# Patient Record
Sex: FEMALE | Race: WHITE | NOT HISPANIC OR LATINO
[De-identification: names, ages, dates, MRNs, and addresses within clinical notes are randomized per-mention and may not be internally consistent; named-entity substitution may affect disease eponyms.]

---

## 2017-02-16 ENCOUNTER — APPOINTMENT (OUTPATIENT)
Dept: CARDIOLOGY | Facility: CLINIC | Age: 52
End: 2017-02-16

## 2017-03-15 ENCOUNTER — APPOINTMENT (OUTPATIENT)
Dept: CARDIOLOGY | Facility: CLINIC | Age: 52
End: 2017-03-15

## 2017-05-24 ENCOUNTER — APPOINTMENT (OUTPATIENT)
Dept: CARDIOLOGY | Facility: CLINIC | Age: 52
End: 2017-05-24

## 2017-05-30 ENCOUNTER — APPOINTMENT (OUTPATIENT)
Dept: CARDIOLOGY | Facility: CLINIC | Age: 52
End: 2017-05-30

## 2017-12-13 ENCOUNTER — APPOINTMENT (OUTPATIENT)
Dept: CARDIOLOGY | Facility: CLINIC | Age: 52
End: 2017-12-13

## 2019-05-31 ENCOUNTER — RECORD ABSTRACTING (OUTPATIENT)
Age: 54
End: 2019-05-31

## 2019-06-03 ENCOUNTER — NON-APPOINTMENT (OUTPATIENT)
Age: 54
End: 2019-06-03

## 2019-06-03 ENCOUNTER — APPOINTMENT (OUTPATIENT)
Dept: CARDIOLOGY | Facility: CLINIC | Age: 54
End: 2019-06-03
Payer: COMMERCIAL

## 2019-06-03 VITALS
OXYGEN SATURATION: 97 % | DIASTOLIC BLOOD PRESSURE: 80 MMHG | SYSTOLIC BLOOD PRESSURE: 140 MMHG | HEART RATE: 96 BPM | WEIGHT: 258 LBS | HEIGHT: 65 IN | BODY MASS INDEX: 42.99 KG/M2

## 2019-06-03 DIAGNOSIS — Z86.2 PERSONAL HISTORY OF DISEASES OF THE BLOOD AND BLOOD-FORMING ORGANS AND CERTAIN DISORDERS INVOLVING THE IMMUNE MECHANISM: ICD-10-CM

## 2019-06-03 DIAGNOSIS — Z87.898 PERSONAL HISTORY OF OTHER SPECIFIED CONDITIONS: ICD-10-CM

## 2019-06-03 DIAGNOSIS — Z86.79 PERSONAL HISTORY OF OTHER DISEASES OF THE CIRCULATORY SYSTEM: ICD-10-CM

## 2019-06-03 DIAGNOSIS — E83.42 HYPOMAGNESEMIA: ICD-10-CM

## 2019-06-03 DIAGNOSIS — Z86.39 PERSONAL HISTORY OF OTHER ENDOCRINE, NUTRITIONAL AND METABOLIC DISEASE: ICD-10-CM

## 2019-06-03 DIAGNOSIS — Z87.19 PERSONAL HISTORY OF OTHER DISEASES OF THE DIGESTIVE SYSTEM: ICD-10-CM

## 2019-06-03 DIAGNOSIS — E78.5 HYPERLIPIDEMIA, UNSPECIFIED: ICD-10-CM

## 2019-06-03 DIAGNOSIS — Z78.9 OTHER SPECIFIED HEALTH STATUS: ICD-10-CM

## 2019-06-03 PROCEDURE — 0296T: CPT | Mod: 59

## 2019-06-03 PROCEDURE — 99214 OFFICE O/P EST MOD 30 MIN: CPT

## 2019-06-03 PROCEDURE — 93000 ELECTROCARDIOGRAM COMPLETE: CPT

## 2019-06-03 RX ORDER — MESALAMINE 1.2 G/1
1.2 TABLET, DELAYED RELEASE ORAL TWICE DAILY
Refills: 0 | Status: ACTIVE | COMMUNITY

## 2019-06-03 RX ORDER — POTASSIUM CHLORIDE 1500 MG/1
20 TABLET, EXTENDED RELEASE ORAL DAILY
Refills: 0 | Status: ACTIVE | COMMUNITY

## 2019-06-03 RX ORDER — HYDROCHLOROTHIAZIDE 25 MG/1
25 TABLET ORAL DAILY
Refills: 0 | Status: ACTIVE | COMMUNITY

## 2019-06-03 RX ORDER — FOLIC ACID 1 MG/1
1 TABLET ORAL DAILY
Refills: 0 | Status: ACTIVE | COMMUNITY

## 2019-06-03 NOTE — HISTORY OF PRESENT ILLNESS
[FreeTextEntry1] : MIGUEL ANGEL CLEVELAND  is a 53 year old  F\par \par Follow up of PAF s/p RFA, HTN, obesity, crohn's, low Mg, edema\par \par There is no prior history of a clinical myocardial infarction, coronary revascularization. \par There is no history of symptomatic congestive heart failure rheumatic heart disease or valvular disease.\par There is no history of symptomatic arrhythmias including atrial fibrillation.\par \par Intermittent palpitations for months. \par Put herself on the monitor at the hospital, which demonstrated premature beats. \par There are recurrent episodes, several times a month, and the symptoms can last for hours at a time. \par There is no exertional chest pain, pressure or discomfort. \par There is no significant dyspnea on exertion or orthopnea. \par There is nolightheadedness, dizziness or syncope.\par No bleeding issues. No bright red blood per rectum or peptic ulcer disease. \par She reports minor weight gain\par Overall less active due to foot pain. \par \par EKG demonstrates normal sinus rhythm \par \par Interim, lived in [place] due to the illness and subsequent death of her mother.

## 2019-06-03 NOTE — PHYSICAL EXAM
[General Appearance - Well Developed] : well developed [Well Groomed] : well groomed [Normal Appearance] : normal appearance [General Appearance - Well Nourished] : well nourished [No Deformities] : no deformities [General Appearance - In No Acute Distress] : no acute distress [Normal Conjunctiva] : the conjunctiva exhibited no abnormalities [Eyelids - No Xanthelasma] : the eyelids demonstrated no xanthelasmas [Normal Oral Mucosa] : normal oral mucosa [No Oral Pallor] : no oral pallor [No Oral Cyanosis] : no oral cyanosis [Normal Jugular Venous A Waves Present] : normal jugular venous A waves present [Normal Jugular Venous V Waves Present] : normal jugular venous V waves present [No Jugular Venous Erickson A Waves] : no jugular venous erickson A waves [Respiration, Rhythm And Depth] : normal respiratory rhythm and effort [Exaggerated Use Of Accessory Muscles For Inspiration] : no accessory muscle use [Auscultation Breath Sounds / Voice Sounds] : lungs were clear to auscultation bilaterally [Heart Rate And Rhythm] : heart rate and rhythm were normal [Murmurs] : no murmurs present [Abdomen Soft] : soft [Abdomen Tenderness] : non-tender [Abdomen Mass (___ Cm)] : no abdominal mass palpated [Abnormal Walk] : normal gait [Nail Clubbing] : no clubbing of the fingernails [Cyanosis, Localized] : no localized cyanosis [Petechial Hemorrhages (___cm)] : no petechial hemorrhages [Skin Color & Pigmentation] : normal skin color and pigmentation [] : no rash [No Venous Stasis] : no venous stasis [Skin Lesions] : no skin lesions [No Skin Ulcers] : no skin ulcer [No Xanthoma] : no  xanthoma was observed [Oriented To Time, Place, And Person] : oriented to person, place, and time [Affect] : the affect was normal [Mood] : the mood was normal [No Anxiety] : not feeling anxious [FreeTextEntry1] : distant hs

## 2019-06-03 NOTE — ASSESSMENT
[FreeTextEntry1] : h/o PAF (prior sx, RVR), s/p RFA, off flecainide\par HTN\par Palpitations\par \par Start beta blocker due to hypertension and symptomatic palpitations.\par Zio patch will be applied to rule out recurrent atrial fibrillation. \par Followup echocardiogram. \par Blood work has been requested\par Continue anticoagulation.  \par Monitor hemoglobin and renal function. \par Weight loss for long-term cardiovascular health\par Supplement lytes\par Low-sodium diet. \par \par

## 2019-06-21 PROCEDURE — 0298T: CPT

## 2019-06-24 ENCOUNTER — APPOINTMENT (OUTPATIENT)
Dept: CARDIOLOGY | Facility: CLINIC | Age: 54
End: 2019-06-24
Payer: COMMERCIAL

## 2019-06-24 PROCEDURE — 93306 TTE W/DOPPLER COMPLETE: CPT

## 2019-07-15 ENCOUNTER — APPOINTMENT (OUTPATIENT)
Dept: CARDIOLOGY | Facility: CLINIC | Age: 54
End: 2019-07-15
Payer: COMMERCIAL

## 2019-07-15 VITALS
WEIGHT: 252 LBS | DIASTOLIC BLOOD PRESSURE: 70 MMHG | BODY MASS INDEX: 41.99 KG/M2 | SYSTOLIC BLOOD PRESSURE: 126 MMHG | OXYGEN SATURATION: 98 % | HEART RATE: 84 BPM | HEIGHT: 65 IN

## 2019-07-15 PROCEDURE — 99214 OFFICE O/P EST MOD 30 MIN: CPT

## 2019-07-15 NOTE — REASON FOR VISIT
[Consultation] : a consultation regarding [Atrial Fibrillation] : atrial fibrillation [Palpitations] : palpitations

## 2019-07-15 NOTE — REVIEW OF SYSTEMS
[Recent Weight Gain (___ Lbs)] : recent [unfilled] ~Ulb weight gain [Palpitations] : palpitations [see HPI] : see HPI [Joint Pain] : joint pain [Under Stress] : under stress [Negative] : Heme/Lymph

## 2019-07-17 NOTE — HISTORY OF PRESENT ILLNESS
[FreeTextEntry1] : MIGUEL ANGEL CLEVELAND  is a 53 year old  F\par Follow up of PAF s/p RFA, HTN, obesity, crohn's, low Mg, edema\par \par There is no prior history of a clinical myocardial infarction, coronary revascularization. \par There is no history of symptomatic congestive heart failure rheumatic heart disease or valvular disease.\par There is no history of symptomatic arrhythmias including atrial fibrillation.\par \par Intermittent palpitations for months. \par Put herself on the monitor at the hospital, which demonstrated premature beats. \par There are recurrent episodes, several times a month, and the symptoms can last for hours at a time. \par There is no exertional chest pain, pressure or discomfort. \par There is no significant dyspnea on exertion or orthopnea. \par There is no lightheadedness, dizziness or syncope.\par No bleeding issues. No bright red blood per rectum or peptic ulcer disease. \par She reports minor weight gain\par Overall less active due to foot pain. \par \par Overall she reports feeling improved on metoprolol. \par There has been improvement in her blood pressure. \par June 2019, hemoglobin 11.2, potassium 3.9, creatinine 0.6, total chol 161, LDL 61 A1c 5.6. TSH 2.4. \par Echocardiogram demonstrates normal left ventricular function, minimal valvular heart disease, mild left atrial enlargement. \par Zio patch with rare ectopy sequential APCs\par EKG demonstrates normal sinus rhythm \par

## 2019-07-17 NOTE — PHYSICAL EXAM
[General Appearance - Well Developed] : well developed [Normal Appearance] : normal appearance [Well Groomed] : well groomed [General Appearance - Well Nourished] : well nourished [No Deformities] : no deformities [General Appearance - In No Acute Distress] : no acute distress [Normal Conjunctiva] : the conjunctiva exhibited no abnormalities [Eyelids - No Xanthelasma] : the eyelids demonstrated no xanthelasmas [Normal Oral Mucosa] : normal oral mucosa [No Oral Pallor] : no oral pallor [No Oral Cyanosis] : no oral cyanosis [Normal Jugular Venous A Waves Present] : normal jugular venous A waves present [Normal Jugular Venous V Waves Present] : normal jugular venous V waves present [No Jugular Venous Erickson A Waves] : no jugular venous erickson A waves [Respiration, Rhythm And Depth] : normal respiratory rhythm and effort [Exaggerated Use Of Accessory Muscles For Inspiration] : no accessory muscle use [Auscultation Breath Sounds / Voice Sounds] : lungs were clear to auscultation bilaterally [Heart Rate And Rhythm] : heart rate and rhythm were normal [Murmurs] : no murmurs present [Abdomen Soft] : soft [Abdomen Tenderness] : non-tender [Abdomen Mass (___ Cm)] : no abdominal mass palpated [Abnormal Walk] : normal gait [Nail Clubbing] : no clubbing of the fingernails [Cyanosis, Localized] : no localized cyanosis [Petechial Hemorrhages (___cm)] : no petechial hemorrhages [Skin Color & Pigmentation] : normal skin color and pigmentation [] : no rash [No Venous Stasis] : no venous stasis [Skin Lesions] : no skin lesions [No Skin Ulcers] : no skin ulcer [No Xanthoma] : no  xanthoma was observed [Oriented To Time, Place, And Person] : oriented to person, place, and time [Affect] : the affect was normal [Mood] : the mood was normal [No Anxiety] : not feeling anxious [FreeTextEntry1] : distant hs

## 2019-07-17 NOTE — ASSESSMENT
[FreeTextEntry1] : h/o PAF (prior sx, RVR), s/p RFA, off flecainide\par HTN\par Palpitations\par \par There is normal left ventricular function. There is minimal valvular heart disease. \par Benign premature beats. \par No recurrent atrial fibrillation. \par Continue metoprolol.\par Continue anticoagulation. \par Monitor hemoglobin and renal function. \par Weight loss for long-term cardiovascular health\par Supplement lytes\par Low-sodium diet. \par

## 2019-07-18 ENCOUNTER — MEDICATION RENEWAL (OUTPATIENT)
Age: 54
End: 2019-07-18

## 2019-10-08 ENCOUNTER — MEDICATION RENEWAL (OUTPATIENT)
Age: 54
End: 2019-10-08

## 2019-12-18 ENCOUNTER — RECORD ABSTRACTING (OUTPATIENT)
Age: 54
End: 2019-12-18

## 2019-12-30 ENCOUNTER — APPOINTMENT (OUTPATIENT)
Dept: CARDIOLOGY | Facility: CLINIC | Age: 54
End: 2019-12-30
Payer: COMMERCIAL

## 2019-12-30 ENCOUNTER — NON-APPOINTMENT (OUTPATIENT)
Age: 54
End: 2019-12-30

## 2019-12-30 VITALS
HEIGHT: 65 IN | WEIGHT: 265 LBS | OXYGEN SATURATION: 98 % | BODY MASS INDEX: 44.15 KG/M2 | DIASTOLIC BLOOD PRESSURE: 68 MMHG | SYSTOLIC BLOOD PRESSURE: 124 MMHG | HEART RATE: 90 BPM

## 2019-12-30 PROCEDURE — 99214 OFFICE O/P EST MOD 30 MIN: CPT

## 2019-12-30 PROCEDURE — 93000 ELECTROCARDIOGRAM COMPLETE: CPT

## 2019-12-30 NOTE — REASON FOR VISIT
[Atrial Fibrillation] : atrial fibrillation [Palpitations] : palpitations [Consultation] : a consultation regarding [Hypertension] : hypertension

## 2019-12-31 NOTE — HISTORY OF PRESENT ILLNESS
[FreeTextEntry1] : MIGUEL ANGEL CLEVELAND  is a 54 year old  F\par Follow up of PAF s/p RFA, HTN, obesity, crohn's, low Mg, edema\par \par There is no prior history of a clinical myocardial infarction, coronary revascularization. \par There is no history of symptomatic congestive heart failure rheumatic heart disease or valvular disease.\par \par There is no exertional chest pain, pressure or discomfort. \par There is no significant dyspnea on exertion or orthopnea. \par There is no lightheadedness, dizziness or syncope.\par No bleeding issues. No bright red blood per rectum or peptic ulcer disease. \par \par She notes more palpitations over the past months. Described as hard beats, which last for seconds. \par \par EKG demonstrates normal sinus rhythm.  \par June 2019, hemoglobin 11.2, potassium 3.9, creatinine 0.6, total chol 161, LDL 61 A1c 5.6. TSH 2.4. \par Echocardiogram 6/19 demonstrates normal left ventricular function, minimal valvular heart disease, mild left atrial enlargement. \par Zio patch 6/19 with rare ectopy sequential APCs\par

## 2019-12-31 NOTE — PHYSICAL EXAM
[General Appearance - Well Developed] : well developed [Well Groomed] : well groomed [Normal Appearance] : normal appearance [General Appearance - Well Nourished] : well nourished [No Deformities] : no deformities [General Appearance - In No Acute Distress] : no acute distress [Normal Conjunctiva] : the conjunctiva exhibited no abnormalities [Eyelids - No Xanthelasma] : the eyelids demonstrated no xanthelasmas [No Oral Pallor] : no oral pallor [Normal Oral Mucosa] : normal oral mucosa [No Oral Cyanosis] : no oral cyanosis [Normal Jugular Venous A Waves Present] : normal jugular venous A waves present [Normal Jugular Venous V Waves Present] : normal jugular venous V waves present [No Jugular Venous Erickson A Waves] : no jugular venous erickson A waves [Respiration, Rhythm And Depth] : normal respiratory rhythm and effort [Exaggerated Use Of Accessory Muscles For Inspiration] : no accessory muscle use [Auscultation Breath Sounds / Voice Sounds] : lungs were clear to auscultation bilaterally [Heart Rate And Rhythm] : heart rate and rhythm were normal [Murmurs] : no murmurs present [Abdomen Soft] : soft [Abdomen Tenderness] : non-tender [Abnormal Walk] : normal gait [Abdomen Mass (___ Cm)] : no abdominal mass palpated [Nail Clubbing] : no clubbing of the fingernails [Cyanosis, Localized] : no localized cyanosis [Petechial Hemorrhages (___cm)] : no petechial hemorrhages [Skin Color & Pigmentation] : normal skin color and pigmentation [] : no rash [No Venous Stasis] : no venous stasis [Skin Lesions] : no skin lesions [No Skin Ulcers] : no skin ulcer [No Xanthoma] : no  xanthoma was observed [Affect] : the affect was normal [Oriented To Time, Place, And Person] : oriented to person, place, and time [Mood] : the mood was normal [No Anxiety] : not feeling anxious [FreeTextEntry1] : distant hs

## 2019-12-31 NOTE — ASSESSMENT
[FreeTextEntry1] : h/o PAF (prior sx, RVR), s/p RFA, off flecainide\par HTN\par Palpitations\par \par There is normal left ventricular function. There is minimal valvular heart disease. \par Benign premature beats. \par No recurrent atrial fibrillation.\par \par I have increased her dose of metoprolol. \par Followup sleep study and blood work have been requested \par Continue anticoagulation.  Monitor hemoglobin and renal function. \par Weight loss for long-term cardiovascular health\par Supplement lytes\par Low-sodium diet. \par

## 2019-12-31 NOTE — REVIEW OF SYSTEMS
[Palpitations] : palpitations [Joint Pain] : joint pain [Under Stress] : under stress [Negative] : Heme/Lymph [see HPI] : see HPI [Change In The Stool] : change in stool [Recent Weight Gain (___ Lbs)] : no recent weight gain

## 2020-06-15 ENCOUNTER — APPOINTMENT (OUTPATIENT)
Dept: CARDIOLOGY | Facility: CLINIC | Age: 55
End: 2020-06-15
Payer: COMMERCIAL

## 2020-06-15 VITALS
OXYGEN SATURATION: 93 % | SYSTOLIC BLOOD PRESSURE: 126 MMHG | BODY MASS INDEX: 42.32 KG/M2 | HEART RATE: 66 BPM | DIASTOLIC BLOOD PRESSURE: 72 MMHG | WEIGHT: 254 LBS | TEMPERATURE: 98.3 F | HEIGHT: 65 IN

## 2020-06-15 PROCEDURE — 99214 OFFICE O/P EST MOD 30 MIN: CPT

## 2020-06-15 NOTE — REASON FOR VISIT
[Atrial Fibrillation] : atrial fibrillation [Consultation] : a consultation regarding [Hypertension] : hypertension [Palpitations] : palpitations

## 2020-06-15 NOTE — ADDENDUM
[FreeTextEntry1] : Please note the patient was seen and examined with JOVON Peace.\par I was physically present during the service of the patient and personally examined the patient. \irena I was directly involved in the management plan and recommendations of the care provided to the patient. \par I personally reviewed the history and physical examination as documented by the PA above.\par 06/15/2020\par

## 2020-06-15 NOTE — REVIEW OF SYSTEMS
[Change In The Stool] : change in stool [see HPI] : see HPI [Joint Pain] : joint pain [Negative] : Heme/Lymph [Recent Weight Gain (___ Lbs)] : no recent weight gain [Palpitations] : no palpitations [Under Stress] : not under stress

## 2020-06-15 NOTE — ASSESSMENT
[FreeTextEntry1] : MIGUEL ANGEL CLEVELAND  is a 54 year F  who presents today Sammy 15, 2020 with the above history and the following active issues. \par \par PAF (prior sx, RVR), s/p RFA, off flecainide. Palpitations improved on Toprol BID. Tolerating Eliquis 5mg BID. Bleeding precautions reviewed. \par \par HTN - blood pressure controlled on my assessment. Low sodium diet. Increase cardiovascular exercise. \par \par There is normal left ventricular function. There is minimal valvular heart disease. \par Benign premature beats. \par No recurrent atrial fibrillation.\par \par Followup sleep study and blood work have been requested \par \par Weight loss for long-term cardiovascular health\par \par Red flag symptoms which would warrant sooner emergent evaluation reviewed with the patient. \par Questions and concerns were addressed and answered. \par \par Sincerely,\par \par Sarah Peace PA-C\par Patients history, testing and plan reviewed with supervising MD: Dr. Virgilio Vidales \par

## 2020-06-15 NOTE — HISTORY OF PRESENT ILLNESS
[FreeTextEntry1] : MIGUEL ANGEL CLEVELAND  is a 54 year F  who presents today Sammy 15, 2020 in clinical follow-up. Last seen in our office on December 30, 2019. There has been no recent illness or hospital stay. Active on a regular basis with no new exertional complaints. Continues to work at the hospital.\par History of PAF s/p RFA, HTN, obesity, crohn's, low Mg, edema\par Palpitations improved on increase dose of Metoprolol.\par Prescriptions for Eliquis and Metoprolol renewed today.\par \par There is no prior history of a clinical myocardial infarction, coronary revascularization. \par There is no history of symptomatic congestive heart failure rheumatic heart disease or valvular disease.\par \par There is no exertional chest pain, pressure or discomfort. \par There is no significant dyspnea on exertion or orthopnea. \par There is no lightheadedness, dizziness or syncope.\par No bleeding issues. No bright red blood per rectum or peptic ulcer disease. \par \par Lab results 6/10/2020  WBC 8.8, Hgb 10.2, Hct 32.2, plat 141, Sodium 141, Potassium 4.0, BUN 14, Creat 7.2, LDL 43, total choelsterol 156, HDL 57\par \par EKG12/30/19 demonstrates normal sinus rhythm.  \par \par June 2019, hemoglobin 11.2, potassium 3.9, creatinine 0.6, total chol 161, LDL 61 A1c 5.6. TSH 2.4. \par Echocardiogram 6/19 demonstrates normal left ventricular function, minimal valvular heart disease, mild left atrial enlargement. \par \par Zio patch 6/19 with rare ectopy sequential APCs\par

## 2020-06-15 NOTE — PHYSICAL EXAM
[Well Groomed] : well groomed [General Appearance - Well Nourished] : well nourished [General Appearance - Well Developed] : well developed [Normal Appearance] : normal appearance [General Appearance - In No Acute Distress] : no acute distress [No Deformities] : no deformities [Normal Conjunctiva] : the conjunctiva exhibited no abnormalities [Eyelids - No Xanthelasma] : the eyelids demonstrated no xanthelasmas [No Oral Cyanosis] : no oral cyanosis [No Oral Pallor] : no oral pallor [Normal Oral Mucosa] : normal oral mucosa [Normal Jugular Venous A Waves Present] : normal jugular venous A waves present [No Jugular Venous Erickson A Waves] : no jugular venous erickson A waves [Normal Jugular Venous V Waves Present] : normal jugular venous V waves present [Exaggerated Use Of Accessory Muscles For Inspiration] : no accessory muscle use [Auscultation Breath Sounds / Voice Sounds] : lungs were clear to auscultation bilaterally [Respiration, Rhythm And Depth] : normal respiratory rhythm and effort [Heart Rate And Rhythm] : heart rate and rhythm were normal [Murmurs] : no murmurs present [Abdomen Soft] : soft [Abdomen Tenderness] : non-tender [Abdomen Mass (___ Cm)] : no abdominal mass palpated [Abnormal Walk] : normal gait [Cyanosis, Localized] : no localized cyanosis [Nail Clubbing] : no clubbing of the fingernails [Petechial Hemorrhages (___cm)] : no petechial hemorrhages [Skin Color & Pigmentation] : normal skin color and pigmentation [] : no rash [No Venous Stasis] : no venous stasis [No Xanthoma] : no  xanthoma was observed [Skin Lesions] : no skin lesions [No Skin Ulcers] : no skin ulcer [Affect] : the affect was normal [Oriented To Time, Place, And Person] : oriented to person, place, and time [Mood] : the mood was normal [No Anxiety] : not feeling anxious [FreeTextEntry1] : distant hs

## 2021-01-18 ENCOUNTER — NON-APPOINTMENT (OUTPATIENT)
Age: 56
End: 2021-01-18

## 2021-01-18 ENCOUNTER — APPOINTMENT (OUTPATIENT)
Dept: CARDIOLOGY | Facility: CLINIC | Age: 56
End: 2021-01-18
Payer: COMMERCIAL

## 2021-01-18 VITALS
HEART RATE: 95 BPM | SYSTOLIC BLOOD PRESSURE: 136 MMHG | DIASTOLIC BLOOD PRESSURE: 80 MMHG | TEMPERATURE: 98 F | WEIGHT: 247 LBS | OXYGEN SATURATION: 100 % | BODY MASS INDEX: 41.15 KG/M2 | HEIGHT: 65 IN

## 2021-01-18 DIAGNOSIS — R79.89 OTHER SPECIFIED ABNORMAL FINDINGS OF BLOOD CHEMISTRY: ICD-10-CM

## 2021-01-18 PROCEDURE — 99214 OFFICE O/P EST MOD 30 MIN: CPT

## 2021-01-18 PROCEDURE — 93000 ELECTROCARDIOGRAM COMPLETE: CPT

## 2021-01-18 PROCEDURE — 99072 ADDL SUPL MATRL&STAF TM PHE: CPT

## 2021-01-18 NOTE — REASON FOR VISIT
[Consultation] : a consultation regarding [Atrial Fibrillation] : atrial fibrillation [Hypertension] : hypertension [Palpitations] : palpitations

## 2021-01-19 NOTE — PHYSICAL EXAM
[General Appearance - Well Developed] : well developed [Normal Appearance] : normal appearance [Well Groomed] : well groomed [General Appearance - Well Nourished] : well nourished [No Deformities] : no deformities [General Appearance - In No Acute Distress] : no acute distress [Normal Conjunctiva] : the conjunctiva exhibited no abnormalities [Eyelids - No Xanthelasma] : the eyelids demonstrated no xanthelasmas [Normal Oral Mucosa] : normal oral mucosa [No Oral Pallor] : no oral pallor [No Oral Cyanosis] : no oral cyanosis [Normal Jugular Venous A Waves Present] : normal jugular venous A waves present [Normal Jugular Venous V Waves Present] : normal jugular venous V waves present [No Jugular Venous Erickson A Waves] : no jugular venous erickson A waves [Respiration, Rhythm And Depth] : normal respiratory rhythm and effort [Exaggerated Use Of Accessory Muscles For Inspiration] : no accessory muscle use [Auscultation Breath Sounds / Voice Sounds] : lungs were clear to auscultation bilaterally [Heart Rate And Rhythm] : heart rate and rhythm were normal [Murmurs] : no murmurs present [Abdomen Tenderness] : non-tender [Abdomen Soft] : soft [Abdomen Mass (___ Cm)] : no abdominal mass palpated [Nail Clubbing] : no clubbing of the fingernails [Abnormal Walk] : normal gait [Cyanosis, Localized] : no localized cyanosis [Petechial Hemorrhages (___cm)] : no petechial hemorrhages [Skin Color & Pigmentation] : normal skin color and pigmentation [] : no rash [No Venous Stasis] : no venous stasis [Skin Lesions] : no skin lesions [No Skin Ulcers] : no skin ulcer [No Xanthoma] : no  xanthoma was observed [Affect] : the affect was normal [Oriented To Time, Place, And Person] : oriented to person, place, and time [Mood] : the mood was normal [No Anxiety] : not feeling anxious [FreeTextEntry1] : distant hs

## 2021-01-19 NOTE — HISTORY OF PRESENT ILLNESS
[FreeTextEntry1] : MIGUEL ANGEL CLEVELAND  is a 55 year old  F\par \par History of PAF s/p RFA, HTN, obesity, crohn's, low Mg, edema\par There is no prior history of a clinical myocardial infarction, coronary revascularization. \par There is no history of symptomatic congestive heart failure rheumatic heart disease or valvular disease.\par There has been no recent illness or hospital stay. \par \par Active on a regular basis with no new exertional complaints. \par Continues to work at the hospital.\par Palpitations improved on increase dose of Metoprolol.\par There is no exertional chest pain, pressure or discomfort. \par There is no significant dyspnea on exertion or orthopnea. \par There is no lightheadedness, dizziness or syncope.\par No bleeding issues. No bright red blood per rectum or peptic ulcer disease. \par \par EKG demonstrates normal sinus rhythm poor R wave progression and nonspecific ST changes.  \par Lab results 6/10/2020  WBC 8.8, Hgb 10.2, Hct 32.2, plat 141, Sodium 141, Potassium 4.0, BUN 14, Creat 7.2, LDL 43, total choelsterol 156, HDL 57\par \par Echocardiogram 6/19 demonstrates normal left ventricular function, minimal valvular heart disease, mild left atrial enlargement. \par \par Zio patch 6/19 with rare ectopy sequential APCs\par \par

## 2021-01-19 NOTE — ASSESSMENT
[FreeTextEntry1] : \par PAF (prior sx, RVR), s/p RFA\par Off flecainide. \par Benign premature beats. \par No recurrent atrial fibrillation.\par Palpitations improved on Toprol BID. \par Tolerating Eliquis 5mg BID. Bleeding precautions reviewed. \par \par HTN \par Blood pressure controlled on my assessment.\par Low sodium diet\par Increase cardiovascular exercise. \par \par Weight loss for long-term cardiovascular health \par \par Continue anticoagulation.  Introduced possible long-term monitoring if off anticoagulation.  \par Follow-up blood work has been requested.  \par Monitor LV function and valvular heart disease.  \par Heart rate and blood pressure improved on higher dose of metoprolol.\par \par Red flag symptoms which would warrant sooner emergent evaluation reviewed with the patient. \par Questions and concerns were addressed and answered. \par

## 2021-04-20 ENCOUNTER — NON-APPOINTMENT (OUTPATIENT)
Age: 56
End: 2021-04-20

## 2021-06-15 ENCOUNTER — APPOINTMENT (OUTPATIENT)
Dept: CARDIOLOGY | Facility: CLINIC | Age: 56
End: 2021-06-15

## 2021-06-25 ENCOUNTER — APPOINTMENT (OUTPATIENT)
Dept: CARDIOLOGY | Facility: CLINIC | Age: 56
End: 2021-06-25
Payer: COMMERCIAL

## 2021-06-25 VITALS
HEART RATE: 76 BPM | TEMPERATURE: 97.5 F | HEIGHT: 65 IN | OXYGEN SATURATION: 99 % | SYSTOLIC BLOOD PRESSURE: 124 MMHG | DIASTOLIC BLOOD PRESSURE: 80 MMHG | WEIGHT: 238 LBS | BODY MASS INDEX: 39.65 KG/M2

## 2021-06-25 PROCEDURE — 99072 ADDL SUPL MATRL&STAF TM PHE: CPT

## 2021-06-25 PROCEDURE — 99214 OFFICE O/P EST MOD 30 MIN: CPT

## 2021-06-25 RX ORDER — CHLORHEXIDINE GLUCONATE, 0.12% ORAL RINSE 1.2 MG/ML
0.12 SOLUTION DENTAL
Qty: 473 | Refills: 0 | Status: DISCONTINUED | COMMUNITY
Start: 2021-01-13 | End: 2021-06-25

## 2021-06-25 RX ORDER — CHROMIUM 200 MCG
TABLET ORAL
Refills: 0 | Status: ACTIVE | COMMUNITY

## 2021-06-25 RX ORDER — IRON/IRON ASP GLY/FA/MV-MIN 38 125-25-1MG
TABLET ORAL
Refills: 0 | Status: ACTIVE | COMMUNITY

## 2021-06-25 NOTE — ASSESSMENT
[FreeTextEntry1] : MIGUEL ANGEL CLEVELAND is a 55 year old F who presents today Jun 25, 2021 here for routine cardiovascular followup. \par \par PAF (prior sx, RVR), s/p RFA  2016\par Off flecainide. \par Benign premature beats. \par No recurrent atrial fibrillation.\par Palpitations improved on Toprol BID. \par Tolerating Eliquis 5mg BID. Bleeding precautions reviewed. \par If off AC consider long term monitoring. \par Note baseline anemia (hgb range 9-11) on iron supp. Past colo showed no bleeding. \par \par HTN \par Blood pressure controlled on my assessment.\par Low sodium diet\par Increase cardiovascular exercise. \par Weight loss for long-term cardiovascular health \par Congrats on recent dieting/weight loss\par \par Monitor LV function and valvular heart disease.  \par Repeat echo requested. Will call her with test results. \par \par No new symptoms from cardiovascular standpoint. Very well controlled lipid panel, A1c on labs. Continue risk factor modification. Stress testing deferred d/t lack of symptoms. If any new exertional symptoms occur she'll call us right away. Any questions and concerns were addressed and resolved. \par \par Sincerely,\par \par CUONG Mahoney\par Patients history, testing, and plan reviewed with supervising MD: Dr. Sanchez Hendrickson \par

## 2021-06-25 NOTE — HISTORY OF PRESENT ILLNESS
[FreeTextEntry1] : MIGUEL ANGEL CLEVELAND  is a 55 year old  F here for routine cardiovascular followup. \par \par History of PAF s/p RFA 2016, HTN, obesity, Crohn's, low Mg, edema, anemia (hgb range 9-11)\par There is no prior history of a clinical myocardial infarction, coronary revascularization. \par There is no history of symptomatic congestive heart failure rheumatic heart disease or valvular disease.\par There has been no recent illness or hospital stay. 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 Attends the gym on weekends. She has recently started dieting and lost 18 lbs. \par Palpitations improved on increase dose of Metoprolol. No sustained symptomatic recurrence. \par There is no exertional chest pain, pressure or discomfort. \par There is no significant dyspnea on exertion or orthopnea. \par There is no lightheadedness, dizziness or syncope.\par No bleeding issues. No bright red blood per rectum or peptic ulcer disease. \par \par Labs April 2021. hgb 9.6, k 4.2, creat 0.68, LDL 60, HDL 54, A1c 5.4, Mg 1.7, CRP/BNP/CK/TSH/Lp(a) wnl\par \par EKG Jan 2021 demonstrates normal sinus rhythm poor R wave progression and nonspecific ST changes.  \par \par Echocardiogram 6/19 demonstrates normal left ventricular function, minimal valvular heart disease, mild left atrial enlargement. \par \par Zio patch 6/19 with rare ectopy sequential APCs

## 2021-09-28 PROBLEM — R79.89 LOW SERUM LIPOPROTEIN(A): Status: RESOLVED | Noted: 2021-09-28 | Resolved: 2021-09-28

## 2022-01-04 ENCOUNTER — APPOINTMENT (OUTPATIENT)
Dept: CARDIOLOGY | Facility: CLINIC | Age: 57
End: 2022-01-04

## 2022-01-18 ENCOUNTER — APPOINTMENT (OUTPATIENT)
Dept: CARDIOLOGY | Facility: CLINIC | Age: 57
End: 2022-01-18
Payer: COMMERCIAL

## 2022-01-18 ENCOUNTER — NON-APPOINTMENT (OUTPATIENT)
Age: 57
End: 2022-01-18

## 2022-01-18 VITALS
OXYGEN SATURATION: 99 % | WEIGHT: 235 LBS | DIASTOLIC BLOOD PRESSURE: 74 MMHG | SYSTOLIC BLOOD PRESSURE: 130 MMHG | HEIGHT: 65 IN | BODY MASS INDEX: 39.15 KG/M2 | HEART RATE: 73 BPM | TEMPERATURE: 97.8 F

## 2022-01-18 PROCEDURE — 93000 ELECTROCARDIOGRAM COMPLETE: CPT

## 2022-01-18 PROCEDURE — 99214 OFFICE O/P EST MOD 30 MIN: CPT

## 2022-01-18 NOTE — HISTORY OF PRESENT ILLNESS
[FreeTextEntry1] : MIGUEL ANGEL CLEVELAND  is a 56 year old  F \par \par History of PAF s/p RFA 2016, HTN, obesity, Crohn's, low Mg, edema, anemia (hgb range 9-11)\par There is no prior history of a clinical myocardial infarction, coronary revascularization. \par There is no history of symptomatic congestive heart failure rheumatic heart disease or valvular disease.\par There has been no recent illness or hospital stay. 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 Using treadmill at home to stay active. Still losing weight, dieting. \par Palpitations improved on increase dose of Metoprolol. No sustained symptomatic recurrence. Checks on monitor at work sometimes and notes APCs. \par \par There is no exertional chest pain, pressure or discomfort. \par There is no significant dyspnea on exertion or orthopnea. \par There is no lightheadedness, dizziness or syncope.\par No bleeding issues. No bright red blood per rectum or peptic ulcer disease. \par \par EKG today 1/18/22 shows normal sinus rhythm with poor R wave progression unchanged. \par \par Labs April 2021. hgb 9.6, k 4.2, creat 0.68, LDL 60, HDL 54, A1c 5.4, Mg 1.7, CRP/BNP/CK/TSH/Lp(a) wnl\par \par Echocardiogram 6/2021 demonstrates normal left ventricular function, minimal valvular heart disease, mild left atrial enlargement. Unchanged from prior. \par \par Zio patch 6/19 with rare ectopy sequential APCs\par

## 2022-01-18 NOTE — ASSESSMENT
[FreeTextEntry1] : MIGUEL ANGEL CLEVELAND is a 56 year old F who presents today Jan 18, 2022 with the above history and the following active issues: \par \par PAF (prior sx, RVR), s/p RFA  2016\par Off flecainide. \par Benign premature beats. \par No recurrent atrial fibrillation.\par Palpitations improved on Toprol BID. \par Tolerating Eliquis 5mg BID. Bleeding precautions reviewed. Medications renewed. \par If off AC consider long term monitoring. \par Note baseline anemia (hgb range 9-11) on iron supp. Past colo showed no bleeding. \par Recommend routine CBC monitoring, upcoming labs w/ PCP requested. \par \par HTN \par Blood pressure controlled on my assessment.\par Low sodium diet\par Increase cardiovascular exercise. \par Weight loss for long-term cardiovascular health \par Congrats on recent dieting/weight loss\par \par Reviewed echo completed at [facility] in Jun '21 no change from prior. Normal EF, mild LAE, mild MR. \par Surveillance monitoring advised. \par \par No new symptoms from cardiovascular standpoint. Very well controlled lipid panel, A1c on labs. Continue risk factor modification. Stress testing deferred d/t lack of symptoms. If any new exertional symptoms occur she'll call us right away. Any questions and concerns were addressed and resolved. \par \par Sincerely,\par \par CUONG Mahoney\par Patients history, testing, and plan reviewed with supervising MD: Dr. Jenniffer Vargas

## 2022-07-18 ENCOUNTER — APPOINTMENT (OUTPATIENT)
Dept: CARDIOLOGY | Facility: CLINIC | Age: 57
End: 2022-07-18

## 2022-07-18 VITALS
BODY MASS INDEX: 38.61 KG/M2 | OXYGEN SATURATION: 97 % | DIASTOLIC BLOOD PRESSURE: 66 MMHG | SYSTOLIC BLOOD PRESSURE: 122 MMHG | WEIGHT: 232 LBS | HEART RATE: 72 BPM | TEMPERATURE: 97.5 F

## 2022-07-18 PROCEDURE — 99214 OFFICE O/P EST MOD 30 MIN: CPT

## 2022-08-02 NOTE — ASSESSMENT
[FreeTextEntry1] : \par Continue beta-blocker and anticoagulation. \par If recurrent GI bleeding and anemia discussed potential for left atrial appendage occlusion. \par \par PAF (prior sx, RVR), s/p RFA 2016\par Off flecainide. \par Benign premature beats. \par No recurrent atrial fibrillation.\par Palpitations improved on Toprol BID. \par Tolerating Eliquis 5mg BID. Bleeding precautions reviewed. Medications renewed. \par If off AC consider long term monitoring. \par Note baseline anemia (hgb range 9-11) on iron supp. \par routine CBC monitoring\par \par HTN \par Blood pressure controlled on my assessment.\par Low sodium diet\par Increase cardiovascular exercise. \par Weight loss for long-term cardiovascular health \par Congrats on recent dieting/weight loss\par \par Reviewed echo completed at [facility] in Jun '21 no change from prior. Normal EF, mild LAE, mild MR. \par Surveillance monitoring advised. \par \par No new symptoms from cardiovascular standpoint. Very well controlled lipid panel, A1c on labs. Continue risk factor modification. Stress testing deferred d/t lack of symptoms. If any new exertional symptoms occur she'll call us right away. Any questions and concerns were addressed and resolved. \par  Yes

## 2022-08-02 NOTE — HISTORY OF PRESENT ILLNESS
[FreeTextEntry1] : MIGUEL ANGEL CLEVELAND  is a 56 year old  F \par \par History of PAF s/p RFA 2016, HTN, obesity, Crohn's, low Mg, edema, anemia (hgb range 9-11)\par There is no prior history of a clinical myocardial infarction, coronary revascularization. \par There is no history of symptomatic congestive heart failure rheumatic heart disease or valvular disease.\par There has been no recent illness or hospital stay. 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 Using treadmill at home to stay active. Still losing weight, dieting. \par Palpitations improved on increase dose of Metoprolol. No sustained symptomatic recurrence. Checks on monitor at work sometimes and notes APCs. \par \par There is no exertional chest pain, pressure or discomfort. \par There is no significant dyspnea on exertion or orthopnea. \par There is no lightheadedness, dizziness or syncope.\par No bleeding issues. No bright red blood per rectum or peptic ulcer disease.\par \par In terms she was admitted to the hospital with a Crohn's flare. She had symptomatic palpitations. She required immunosuppression's fluids and electrolytes. There was no recurrent atrial fibrillation. \par \par EKG 1/18/22 shows normal sinus rhythm with poor R wave progression unchanged. \par Labs April 2021. hgb 9.6, k 4.2, creat 0.68, LDL 60, HDL 54, A1c 5.4, Mg 1.7, CRP/BNP/CK/TSH/Lp(a) wnl\par Echocardiogram 6/2021 demonstrates normal left ventricular function, minimal valvular heart disease, mild left atrial enlargement. Unchanged from prior. \par Zio patch 6/19 with rare ectopy sequential APCs\par

## 2023-01-23 ENCOUNTER — NON-APPOINTMENT (OUTPATIENT)
Age: 58
End: 2023-01-23

## 2023-01-23 ENCOUNTER — APPOINTMENT (OUTPATIENT)
Dept: CARDIOLOGY | Facility: CLINIC | Age: 58
End: 2023-01-23
Payer: COMMERCIAL

## 2023-01-23 VITALS
BODY MASS INDEX: 40.32 KG/M2 | WEIGHT: 242 LBS | DIASTOLIC BLOOD PRESSURE: 62 MMHG | OXYGEN SATURATION: 99 % | HEART RATE: 76 BPM | HEIGHT: 65 IN | SYSTOLIC BLOOD PRESSURE: 116 MMHG

## 2023-01-23 DIAGNOSIS — Z00.00 ENCOUNTER FOR GENERAL ADULT MEDICAL EXAMINATION W/OUT ABNORMAL FINDINGS: ICD-10-CM

## 2023-01-23 PROCEDURE — 99214 OFFICE O/P EST MOD 30 MIN: CPT

## 2023-01-23 RX ORDER — MAGNESIUM CHLORIDE 71.5 MG
71.5-119 TABLET, DELAYED RELEASE (ENTERIC COATED) ORAL
Refills: 0 | Status: ACTIVE | COMMUNITY

## 2023-01-23 RX ORDER — MAGNESIUM CHLORIDE 64 MG
64-106 TABLET, DELAYED RELEASE (ENTERIC COATED) ORAL TWICE DAILY
Refills: 0 | Status: DISCONTINUED | COMMUNITY
End: 2023-01-23

## 2023-01-23 NOTE — HISTORY OF PRESENT ILLNESS
[FreeTextEntry1] : MIGUEL ANGEL CLEVELAND  is a 57 year old  F \par \par History of PAF s/p RFA 2016, HTN, obesity, Crohn's, low Mg, edema, anemia (hgb range 9-11)\par \par There is no prior history of a clinical myocardial infarction, coronary revascularization. \par There is no history of symptomatic congestive heart failure rheumatic heart disease or valvular disease.\par There has been no recent illness or hospital stay. 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 Using treadmill at home to stay active. \par Palpitations improved on increase dose of Metoprolol. No sustained symptomatic recurrence. Checks on monitor at work sometimes and notes APCs. \par \par There is no exertional chest pain, pressure or discomfort. \par There is no significant dyspnea on exertion or orthopnea. \par There is no lightheadedness, dizziness or syncope.\par No bleeding issues. No bright red blood per rectum or peptic ulcer disease.\par \par In terms she was admitted to the hospital with a Crohn's flare summer 2022. She had symptomatic palpitations. She required immunosuppression fluids and electrolytes. There was no recurrent atrial fibrillation. No bleeding issues on AC. \par She tried Saxenda, working with PCP for weight loss. Had significant GI side effects and stopped but plans to revisit discussion w their office. \par \par EKG 1/23/23 shows normal sinus rhythm unchanged. \par Labs April 2021. hgb 9.6, k 4.2, creat 0.68, LDL 60, HDL 54, A1c 5.4, Mg 1.7, CRP/BNP/CK/TSH/Lp(a) wnl\par Echocardiogram 6/2021 demonstrates normal left ventricular function, minimal valvular heart disease, mild left atrial enlargement. \par Zio patch 6/19 with rare ectopy sequential APCs\par

## 2023-01-23 NOTE — ASSESSMENT
[FreeTextEntry1] : MIGUEL ANGEL CLEVELAND is a 57 year old F who presents today Jan 23, 2023 with the above history and the following active issues: \par \par PAF (prior sx, RVR), s/p RFA 2016\par Off flecainide. \par Benign premature beats. \par No recurrent atrial fibrillation.\par Palpitations improved on Toprol BID. \par Tolerating Eliquis 5mg BID. Bleeding precautions reviewed. Medications renewed. \par If off AC consider long term monitoring. \par Note baseline anemia (hgb range 9-11) on iron supp. \par routine CBC monitoring\par event monitor if symptomatic, not needed at this time\par surveillance echo requested d/t LAE on prior\par \par HTN \par Blood pressure controlled on my assessment.\par Low sodium diet\par Increase cardiovascular exercise. \par Weight loss for long-term cardiovascular health - agree with use of GLP1a for CV risk reduction benefits however recently had significant GI side effects. She is seeing PCP for alternatives. \par \par Mild MR. \par Surveillance monitoring advised. \par \par Screening carotid/abdominal studies requested. Will obtain fasting lipid panel and HbA1c for further CVD risk stratification. \par No new symptoms from cardiovascular standpoint. Stress testing if symptoms or LV dysfunction. F/U after testing. If any new exertional symptoms occur she'll call us right away. Any questions and concerns were addressed and resolved. \par \par Sincerely,\par \par CUONG Mahoney\par Patients history, testing, and plan reviewed with supervising MD: Dr. Virgilio Vidales

## 2023-01-23 NOTE — ADDENDUM
[FreeTextEntry1] : Please note the patient was reviewed with NP Toña Colón.\par I was physically present during the service of the patient.\par I was directly involved in the management plan and recommendations of the care provided to the patient. \par I personally reviewed the history and physical examination as documented by the NP above.\par \par

## 2023-02-17 ENCOUNTER — APPOINTMENT (OUTPATIENT)
Dept: CARDIOLOGY | Facility: CLINIC | Age: 58
End: 2023-02-17
Payer: COMMERCIAL

## 2023-02-17 PROCEDURE — 93880 EXTRACRANIAL BILAT STUDY: CPT

## 2023-02-17 PROCEDURE — 93306 TTE W/DOPPLER COMPLETE: CPT

## 2023-02-17 PROCEDURE — 93979 VASCULAR STUDY: CPT

## 2023-03-13 ENCOUNTER — APPOINTMENT (OUTPATIENT)
Dept: CARDIOLOGY | Facility: CLINIC | Age: 58
End: 2023-03-13
Payer: COMMERCIAL

## 2023-03-13 VITALS
HEART RATE: 76 BPM | DIASTOLIC BLOOD PRESSURE: 78 MMHG | HEIGHT: 65 IN | WEIGHT: 237 LBS | SYSTOLIC BLOOD PRESSURE: 122 MMHG | OXYGEN SATURATION: 98 % | BODY MASS INDEX: 39.49 KG/M2

## 2023-03-13 DIAGNOSIS — R00.2 PALPITATIONS: ICD-10-CM

## 2023-03-13 PROCEDURE — 99214 OFFICE O/P EST MOD 30 MIN: CPT

## 2023-03-13 NOTE — ADDENDUM
[FreeTextEntry1] : Please note the patient was reviewed with NP Toña Chavez.\par I was physically present during the service of the patient.\par I was directly involved in the management plan and recommendations of the care provided to the patient. \par I personally reviewed the history and physical examination as documented by the NP above.\par \par

## 2023-03-13 NOTE — HISTORY OF PRESENT ILLNESS
[FreeTextEntry1] : MIGUEL ANGEL CLEVELAND  is a 57 year old  F \par \par History of PAF s/p RFA 2016, HTN, obesity, Crohn's, low Mg, edema, anemia (hgb range 9-11)\par There is + FHx of AAA - father  following repair of aortic aneurysm \par \par There is no prior history of a clinical myocardial infarction, coronary revascularization. \par There is no history of symptomatic congestive heart failure rheumatic heart disease or valvular disease.\par There has been no recent illness or hospital stay. 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 Using treadmill at home to stay active. \par Palpitations improved on increase dose of Metoprolol. No sustained symptomatic recurrence. Checks on monitor at work sometimes and notes APCs. \par \par There is no exertional chest pain, pressure or discomfort. \par There is no significant dyspnea on exertion or orthopnea. \par There is no lightheadedness, dizziness or syncope.\par No bleeding issues. No bright red blood per rectum or peptic ulcer disease.\par \par Crohn's flare summer 2022. She had symptomatic palpitations. She required immunosuppression fluids and electrolytes. There was no recurrent atrial fibrillation. No bleeding issues on AC. \par She tried Saxenda, working with PCP for weight loss. Had significant GI side effects and stopped but plans to revisit discussion w their office. \par \par EKG 23 shows normal sinus rhythm unchanged. \par 2023 hg 10.9, k 3.7, cr 0.6, a1c 5.6, LDL 62, tsh wnl \par Labs 2021 CRP/BNP/CK/TSH/Lp(a) wnl\par Echocardiogram 2023  demonstrates normal left ventricular function, minimal valvular heart disease, Mild AS. \par Abd 2023 no AAA\par Carotid 2023 no stenosis \par Zio patch  with rare ectopy sequential APCs\par

## 2023-03-13 NOTE — ASSESSMENT
[FreeTextEntry1] : MIGUEL ANGEL CLEVELAND is a 57 year old F who presents today Mar 13, 2023 with the above history and the following active issues: \par \par PAF (prior sx, RVR), s/p RFA 2016\par Off flecainide. \par Benign premature beats. \par No recurrent atrial fibrillation.\par Palpitations improved on Toprol BID. \par Tolerating Eliquis 5mg BID. Bleeding precautions reviewed. Medications renewed. \par If off AC consider long term monitoring. \par Note baseline anemia (hgb range 9-11) on iron supp. \par routine CBC monitoring\par event monitor if symptomatic, not needed at this time\par \par HTN \par Blood pressure controlled on my assessment.\par Low sodium diet\par Increase cardiovascular exercise. \par Weight loss for long-term cardiovascular health - agree with use of GLP1a for CV risk reduction benefits however recently had significant GI side effects. She is seeing PCP for alternatives. \par \par Mild MR. \par Surveillance monitoring advised. \par \par elevated trigs\par normal LDL\par discussed diet and lifestyle modification measures - planning to start exercise routine \par normal A1c \par \par FHx AAA - father\par No AAA on US testing \par \par No new symptoms from cardiovascular standpoint. Stress testing if symptoms or LV dysfunction.\par If any new exertional symptoms occur she'll call us right away. \par Any questions and concerns were addressed and resolved. \par \par Sincerely,\par \par CUONG Garcia\par Patients history, testing, and plan reviewed with supervising MD: Dr. Virgilio Vidales

## 2023-07-17 ENCOUNTER — APPOINTMENT (OUTPATIENT)
Dept: CARDIOLOGY | Facility: CLINIC | Age: 58
End: 2023-07-17
Payer: COMMERCIAL

## 2023-07-17 VITALS
HEART RATE: 84 BPM | DIASTOLIC BLOOD PRESSURE: 64 MMHG | SYSTOLIC BLOOD PRESSURE: 116 MMHG | HEIGHT: 65 IN | OXYGEN SATURATION: 98 % | BODY MASS INDEX: 38.32 KG/M2 | WEIGHT: 230 LBS

## 2023-07-17 PROCEDURE — 99213 OFFICE O/P EST LOW 20 MIN: CPT

## 2023-07-17 RX ORDER — VEDOLIZUMAB 300 MG/5ML
300 INJECTION, POWDER, LYOPHILIZED, FOR SOLUTION INTRAVENOUS
Refills: 0 | Status: ACTIVE | COMMUNITY

## 2023-08-15 NOTE — ASSESSMENT
[FreeTextEntry1] :  Continue current medical therapy.   Blood work has been requested. anticoagulation and beta-blocker have been refilled.   Discussed need for stress testing if new symptoms.    PAF (prior sx, RVR), s/p RFA 2016 Off flecainide.  Benign premature beats.  No recurrent atrial fibrillation. Palpitations improved on Toprol BID.  Tolerating Eliquis 5mg BID. Bleeding precautions reviewed. Medications renewed.  If off AC consider long term monitoring.  Note baseline anemia (hgb range 9-11) on iron supp.  routine CBC monitoring monitor if symptoms  HTN  Blood pressure controlled on my assessment. Low sodium diet Increase cardiovascular exercise.  Weight loss for long-term cardiovascular health   Mild vhd.  Surveillance monitoring   elevated trigs normal LDL normal A1c  lifestyle modification   FHx AAA - father No AAA on US testing   No new symptoms from cardiovascular standpoint. Stress testing if symptoms or LV dysfunction. Any questions and concerns were addressed and resolved.

## 2023-08-22 NOTE — REASON FOR VISIT
[Follow-Up Visit] : a follow-up visit for [FreeTextEntry2] : Lymphadenopathy [Consultation] : a consultation regarding [Atrial Fibrillation] : atrial fibrillation [Palpitations] : palpitations

## 2024-01-08 ENCOUNTER — NON-APPOINTMENT (OUTPATIENT)
Age: 59
End: 2024-01-08

## 2024-01-08 ENCOUNTER — APPOINTMENT (OUTPATIENT)
Dept: CARDIOLOGY | Facility: CLINIC | Age: 59
End: 2024-01-08
Payer: COMMERCIAL

## 2024-01-08 VITALS
WEIGHT: 236 LBS | SYSTOLIC BLOOD PRESSURE: 124 MMHG | DIASTOLIC BLOOD PRESSURE: 70 MMHG | OXYGEN SATURATION: 99 % | BODY MASS INDEX: 39.32 KG/M2 | HEART RATE: 83 BPM | HEIGHT: 65 IN

## 2024-01-08 DIAGNOSIS — I10 ESSENTIAL (PRIMARY) HYPERTENSION: ICD-10-CM

## 2024-01-08 DIAGNOSIS — E66.9 OBESITY, UNSPECIFIED: ICD-10-CM

## 2024-01-08 DIAGNOSIS — I48.91 UNSPECIFIED ATRIAL FIBRILLATION: ICD-10-CM

## 2024-01-08 DIAGNOSIS — Z79.01 LONG TERM (CURRENT) USE OF ANTICOAGULANTS: ICD-10-CM

## 2024-01-08 PROCEDURE — 99214 OFFICE O/P EST MOD 30 MIN: CPT

## 2024-01-08 PROCEDURE — 93000 ELECTROCARDIOGRAM COMPLETE: CPT

## 2024-01-08 RX ORDER — ONDANSETRON 4 MG/1
4 TABLET ORAL
Qty: 9 | Refills: 0 | Status: DISCONTINUED | COMMUNITY
Start: 2023-01-12 | End: 2024-01-08

## 2024-01-08 RX ORDER — OMEPRAZOLE 40 MG/1
40 CAPSULE, DELAYED RELEASE ORAL
Qty: 90 | Refills: 0 | Status: DISCONTINUED | COMMUNITY
Start: 2023-01-12 | End: 2024-01-08

## 2024-01-10 PROBLEM — E66.9 OBESITY, UNSPECIFIED: Status: ACTIVE | Noted: 2021-01-18

## 2024-01-10 PROBLEM — I10 ESSENTIAL HYPERTENSION: Status: ACTIVE | Noted: 2019-06-03

## 2024-01-10 PROBLEM — I48.91 ATRIAL FIBRILLATION, UNSPECIFIED TYPE: Status: ACTIVE | Noted: 2019-06-03

## 2024-01-10 PROBLEM — Z79.01 ANTICOAGULANT LONG-TERM USE: Status: ACTIVE | Noted: 2019-06-03

## 2024-01-10 RX ORDER — APIXABAN 5 MG/1
5 TABLET, FILM COATED ORAL
Qty: 180 | Refills: 3 | Status: ACTIVE | COMMUNITY
Start: 1900-01-01 | End: 1900-01-01

## 2024-01-10 RX ORDER — METOPROLOL SUCCINATE 25 MG/1
25 TABLET, EXTENDED RELEASE ORAL
Qty: 180 | Refills: 3 | Status: ACTIVE | COMMUNITY
Start: 2019-06-03 | End: 1900-01-01

## 2024-01-10 NOTE — HISTORY OF PRESENT ILLNESS
[FreeTextEntry1] : MIGUEL ANGEL CLEVELAND  is a 58 year old  F She goes to the gym 3 times a week.  She does treadmill and light weights.  Anticoagulation and beta-blocker have been refilled.  EKG reviewed.  EKG demonstrates sinus rhythm with poor R wave progression.  Blood work 2023 magnesium 1.4.  Outside blood work 2023 hemoglobin 11.5 creatinine 1.1 magnesium 1.5 potassium 3.5 discussed importance of electrolyte repletion.  History of PAF s/p RFA , HTN, obesity, Crohn's, low Mg, edema, anemia (hgb range 9-11) There is + FHx of AAA - father  following repair of aortic aneurysm   There is no prior history of a clinical myocardial infarction, coronary revascularization.  There is no history of symptomatic congestive heart failure rheumatic heart disease or valvular disease. There has been no recent illness or hospital stay.   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 Using treadmill at home to stay active.  Palpitations improved on increase dose of Metoprolol. No sustained symptomatic recurrence. Checks on monitor at work sometimes and notes APCs.   There is no exertional chest pain, pressure or discomfort.  There is no significant dyspnea on exertion or orthopnea.  There is no lightheadedness, dizziness or syncope. No bleeding issues. No bright red blood per rectum or peptic ulcer disease.  Crohn's flare summer 2022. She had symptomatic palpitations. She required immunosuppression fluids and electrolytes. There was no recurrent atrial fibrillation. No bleeding issues on AC.   Echocardiogram 2023  demonstrates normal left ventricular function, minimal valvular heart disease, Mild AS.  Abd 2023 no AAA Carotid 2023 no stenosis  Zio patch  with rare ectopy sequential APCs  Continue current medical therapy. anticoagulation and beta-blocker have been refilled. Discussed need for stress testing if new symptoms.  PAF (prior sx, RVR), s/p RFA  Off flecainide. Benign premature beats. No recurrent atrial fibrillation. Palpitations improved on Toprol BID. Tolerating Eliquis 5mg BID. Bleeding precautions reviewed. Medications renewed. If off AC consider long term monitoring. Note baseline anemia (hgb range 9-11) on iron supp. routine CBC monitoring monitor if symptoms  HTN Blood pressure controlled on my assessment. Low sodium diet Increase cardiovascular exercise. Weight loss for long-term cardiovascular health  Mild vhd. Surveillance monitoring  elevated trigs normal LDL normal A1c lifestyle modification  FHx AAA - father No AAA on US testing  No new symptoms from cardiovascular standpoint. Stress testing if symptoms or LV dysfunction. Any questions and concerns were addressed and resolved.

## 2024-04-01 ENCOUNTER — OFFICE (OUTPATIENT)
Dept: URBAN - METROPOLITAN AREA CLINIC 38 | Facility: CLINIC | Age: 59
Setting detail: OPHTHALMOLOGY
End: 2024-04-01
Payer: COMMERCIAL

## 2024-04-01 DIAGNOSIS — H53.002: ICD-10-CM

## 2024-04-01 DIAGNOSIS — H25.13: ICD-10-CM

## 2024-04-01 DIAGNOSIS — H52.4: ICD-10-CM

## 2024-04-01 DIAGNOSIS — H40.013: ICD-10-CM

## 2024-04-01 PROCEDURE — 92133 CPTRZD OPH DX IMG PST SGM ON: CPT | Performed by: OPHTHALMOLOGY

## 2024-04-01 PROCEDURE — 92004 COMPRE OPH EXAM NEW PT 1/>: CPT | Performed by: OPHTHALMOLOGY

## 2024-04-01 PROCEDURE — 76514 ECHO EXAM OF EYE THICKNESS: CPT | Performed by: OPHTHALMOLOGY

## 2024-04-01 PROCEDURE — 92015 DETERMINE REFRACTIVE STATE: CPT | Performed by: OPHTHALMOLOGY

## 2024-07-12 ENCOUNTER — APPOINTMENT (OUTPATIENT)
Dept: CARDIOLOGY | Facility: CLINIC | Age: 59
End: 2024-07-12
Payer: COMMERCIAL

## 2024-07-12 ENCOUNTER — NON-APPOINTMENT (OUTPATIENT)
Age: 59
End: 2024-07-12

## 2024-07-12 VITALS
WEIGHT: 240 LBS | OXYGEN SATURATION: 97 % | SYSTOLIC BLOOD PRESSURE: 124 MMHG | HEIGHT: 65 IN | BODY MASS INDEX: 39.99 KG/M2 | DIASTOLIC BLOOD PRESSURE: 74 MMHG | HEART RATE: 85 BPM

## 2024-07-12 DIAGNOSIS — R00.2 PALPITATIONS: ICD-10-CM

## 2024-07-12 DIAGNOSIS — E66.9 OBESITY, UNSPECIFIED: ICD-10-CM

## 2024-07-12 DIAGNOSIS — I35.0 NONRHEUMATIC AORTIC (VALVE) STENOSIS: ICD-10-CM

## 2024-07-12 DIAGNOSIS — R94.31 ABNORMAL ELECTROCARDIOGRAM [ECG] [EKG]: ICD-10-CM

## 2024-07-12 DIAGNOSIS — Z79.01 LONG TERM (CURRENT) USE OF ANTICOAGULANTS: ICD-10-CM

## 2024-07-12 DIAGNOSIS — R01.1 CARDIAC MURMUR, UNSPECIFIED: ICD-10-CM

## 2024-07-12 DIAGNOSIS — I10 ESSENTIAL (PRIMARY) HYPERTENSION: ICD-10-CM

## 2024-07-12 DIAGNOSIS — I48.91 UNSPECIFIED ATRIAL FIBRILLATION: ICD-10-CM

## 2024-07-12 PROCEDURE — 93000 ELECTROCARDIOGRAM COMPLETE: CPT

## 2024-07-12 PROCEDURE — 99214 OFFICE O/P EST MOD 30 MIN: CPT

## 2024-08-12 ENCOUNTER — APPOINTMENT (OUTPATIENT)
Dept: CARDIOLOGY | Facility: CLINIC | Age: 59
End: 2024-08-12
Payer: COMMERCIAL

## 2024-08-12 PROCEDURE — 93306 TTE W/DOPPLER COMPLETE: CPT

## 2024-09-09 ENCOUNTER — APPOINTMENT (OUTPATIENT)
Dept: CARDIOLOGY | Facility: CLINIC | Age: 59
End: 2024-09-09

## 2024-09-30 ENCOUNTER — APPOINTMENT (OUTPATIENT)
Dept: CARDIOLOGY | Facility: CLINIC | Age: 59
End: 2024-09-30
Payer: COMMERCIAL

## 2024-09-30 VITALS
HEART RATE: 85 BPM | HEIGHT: 65 IN | BODY MASS INDEX: 39.99 KG/M2 | OXYGEN SATURATION: 99 % | SYSTOLIC BLOOD PRESSURE: 120 MMHG | WEIGHT: 240 LBS | DIASTOLIC BLOOD PRESSURE: 62 MMHG

## 2024-09-30 DIAGNOSIS — I48.91 UNSPECIFIED ATRIAL FIBRILLATION: ICD-10-CM

## 2024-09-30 DIAGNOSIS — I10 ESSENTIAL (PRIMARY) HYPERTENSION: ICD-10-CM

## 2024-09-30 DIAGNOSIS — R01.1 CARDIAC MURMUR, UNSPECIFIED: ICD-10-CM

## 2024-09-30 DIAGNOSIS — R00.2 PALPITATIONS: ICD-10-CM

## 2024-09-30 DIAGNOSIS — R94.31 ABNORMAL ELECTROCARDIOGRAM [ECG] [EKG]: ICD-10-CM

## 2024-09-30 DIAGNOSIS — E66.9 OBESITY, UNSPECIFIED: ICD-10-CM

## 2024-09-30 DIAGNOSIS — I35.0 NONRHEUMATIC AORTIC (VALVE) STENOSIS: ICD-10-CM

## 2024-09-30 PROCEDURE — 99214 OFFICE O/P EST MOD 30 MIN: CPT

## 2024-09-30 NOTE — ASSESSMENT
[FreeTextEntry1] : MIGUEL ANGEL CLEVELAND is a 58 year old F who presents today Sep 30, 2024 with the above history and the following active issues:   PAF (prior sx, RVR), s/p RFA 2016 Off flecainide. Recurrent AF 7/11/24 [facility] ER In setting of low K/Mg, diarrhea on Zepbound Converted with electrolyte repletion and extra metoprolol Of note her EKG did show new diffuse ST segment depressions. Neg trops.  In followup CCTA done showed normal cors zero calcium score.  Risk factor modification reviewed.  Followup monitor no recurrent AF. Advised to call for any sx recurrence.  Cont mag supplement, monitor w PCP Cont Toprol BID. Cont Eliquis 5mg BID. Bleeding precautions reviewed. Medications renewed. Note baseline anemia (hgb range 9-11) on iron supp. routine CBC monitoring  Systolic murmur Mild AS normal EF Surveillance monitoring   HTN Blood pressure controlled on my assessment. Low sodium diet Increase cardiovascular exercise. Weight loss for long-term cardiovascular health  elevated trigs normal LDL normal A1c no CAD, aortic atherosclerosis noted diet + lifestyle modification  FHx AAA - father No AAA on US testing  Sincerely,  CUONG Garcia Patients history, testing, and plan reviewed with supervising MD: Dr. Virgilio Vidales

## 2024-09-30 NOTE — ADDENDUM
[FreeTextEntry1] : Please note the patient was reviewed with NP Toña Chavez. I was physically present during the service of the patient. I was directly involved in the management plan and recommendations of the care provided to the patient.  I personally reviewed the history and physical examination as documented by the NP above.

## 2024-09-30 NOTE — HISTORY OF PRESENT ILLNESS
[FreeTextEntry1] : MIGUEL ANGEL CLEVELAND  is a 58 year old  F  History of PAF s/p RFA 2016, HTN, obesity, Crohn's, low Mg, edema, anemia (hgb range 9-11) There is + FHx of AAA - father  following repair of aortic aneurysm   There is no prior history of a clinical myocardial infarction, coronary revascularization.  There is no history of symptomatic congestive heart failure rheumatic heart disease or valvular disease. There has been no recent illness or hospital stay.   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  She does treadmill and light weights.   There is no exertional chest pain, pressure or discomfort.  There is no significant dyspnea on exertion or orthopnea.  There is no lightheadedness, dizziness or syncope.  Crohn's flare summer 2022. She had symptomatic palpitations. She required immunosuppression fluids and electrolytes.   Last seen 2024 after ER visit for recurrent AF. She has tried several GLPs (saxenda, wegovy, now zepbound) all resulting in side effects. Most recently on lowest dose of zepbound has diarrhea and minimal appetite. She started to feel some palpitations then felt weak at work on [employer] HR was 140s and went to ER. On arrival EKG showed AF there was new diffuse ST segment depressions and  bpm. She was given extra dose of metoprolol, electrolytes were repleted, she went home and converted back to normal rhythm at 5pm.  Lab hg 11.4, k 3.2, cr 0.98, TFT wnl, trop neg x3 EKG 24 normal sinus rhythm  Followup monitor showed no evidence of recurrent PAF and no sx recurrence since then. Off GLPs.   CCTA 2024 zero calcium score mild aortic root atherosclerosis and spinal hemangioma Echocardiogram 2024 demonstrates normal left ventricular function, minimal valvular heart disease, Mild AS.  Abd 2023 no AAA Carotid 2023 no stenosis  Lab 2024 LDL 63, trig 189, a1c 5.4, k 3.7, mg 1.4

## 2025-04-02 ENCOUNTER — NON-APPOINTMENT (OUTPATIENT)
Age: 60
End: 2025-04-02

## 2025-04-03 LAB — HBA1C MFR BLD HPLC: 5.4

## 2025-04-04 ENCOUNTER — NON-APPOINTMENT (OUTPATIENT)
Age: 60
End: 2025-04-04

## 2025-04-04 ENCOUNTER — APPOINTMENT (OUTPATIENT)
Dept: CARDIOLOGY | Facility: CLINIC | Age: 60
End: 2025-04-04
Payer: COMMERCIAL

## 2025-04-04 VITALS
WEIGHT: 247 LBS | BODY MASS INDEX: 41.1 KG/M2 | SYSTOLIC BLOOD PRESSURE: 124 MMHG | DIASTOLIC BLOOD PRESSURE: 64 MMHG | HEART RATE: 77 BPM | OXYGEN SATURATION: 99 %

## 2025-04-04 DIAGNOSIS — R01.1 CARDIAC MURMUR, UNSPECIFIED: ICD-10-CM

## 2025-04-04 DIAGNOSIS — I10 ESSENTIAL (PRIMARY) HYPERTENSION: ICD-10-CM

## 2025-04-04 DIAGNOSIS — E66.9 OBESITY, UNSPECIFIED: ICD-10-CM

## 2025-04-04 DIAGNOSIS — I48.91 UNSPECIFIED ATRIAL FIBRILLATION: ICD-10-CM

## 2025-04-04 DIAGNOSIS — R00.2 PALPITATIONS: ICD-10-CM

## 2025-04-04 DIAGNOSIS — I35.0 NONRHEUMATIC AORTIC (VALVE) STENOSIS: ICD-10-CM

## 2025-04-04 DIAGNOSIS — Z79.01 LONG TERM (CURRENT) USE OF ANTICOAGULANTS: ICD-10-CM

## 2025-04-04 PROCEDURE — 93000 ELECTROCARDIOGRAM COMPLETE: CPT

## 2025-04-04 PROCEDURE — 99204 OFFICE O/P NEW MOD 45 MIN: CPT

## 2025-04-04 RX ORDER — SPIRONOLACTONE 25 MG/1
25 TABLET ORAL DAILY
Qty: 90 | Refills: 1 | Status: ACTIVE | COMMUNITY
Start: 2025-04-04 | End: 1900-01-01